# Patient Record
(demographics unavailable — no encounter records)

---

## 2024-11-20 NOTE — PLAN
[FreeTextEntry1] : -F/u pap -Rx BUS for dense breast tissue provided, discussed genetic testing, she will consider  -Recommended dermatology for routine, annual skin survey -Routine physical activity encouraged -RTO one year or sooner PRN for any new problems, questions or concerns

## 2024-11-20 NOTE — PHYSICAL EXAM
[Chaperone Present] : A chaperone was present in the examining room during all aspects of the physical examination [[id number]] : A chaperone was present during the pelvic exam. [FreeTextEntry2] : KOFI Tolentino  [Appropriately responsive] : appropriately responsive [Alert] : alert [No Acute Distress] : no acute distress [Soft] : soft [Non-tender] : non-tender [Non-distended] : non-distended [No Mass] : no mass [Oriented x3] : oriented x3 [Examination Of The Breasts] : a normal appearance [No Masses] : no breast masses were palpable [Labia Majora] : normal [Labia Minora] : normal [Atrophy] : atrophy [No Bleeding] : There was no active vaginal bleeding [Normal] : normal [Tenderness] : nontender [Uterine Adnexae] : non-palpable

## 2024-11-20 NOTE — PHYSICAL EXAM
[Chaperone Present] : A chaperone was present in the examining room during all aspects of the physical examination [[id number]] : A chaperone was present during the pelvic exam. [FreeTextEntry2] : OKFI Tolentino  [Appropriately responsive] : appropriately responsive [Alert] : alert [No Acute Distress] : no acute distress [Soft] : soft [Non-tender] : non-tender [Non-distended] : non-distended [No Mass] : no mass [Oriented x3] : oriented x3 [Examination Of The Breasts] : a normal appearance [No Masses] : no breast masses were palpable [Labia Majora] : normal [Labia Minora] : normal [Atrophy] : atrophy [No Bleeding] : There was no active vaginal bleeding [Normal] : normal [Tenderness] : nontender [Uterine Adnexae] : non-palpable

## 2024-11-20 NOTE — HISTORY OF PRESENT ILLNESS
[LMP unknown] : LMP unknown [postmenopausal] : postmenopausal [Y] : Positive pregnancy history [unknown] : Patient is unsure of the date of her LMP [Menarche Age: ____] : age at menarche was [unfilled] [Currently Active] : currently active [Men] : men [No] : No [Yes] : Yes [FreeTextEntry1] : Claudette is a 56 y.o.   female here for annual exam.   Sexually active, declines STI screening. Denies PMB.   Last pap 2022 NILM.   Screening mammogram 10/2024 BR-1, dense breast tissue. Mom with recent breast CA diagnosis and treatment, maternal aunt with breast CA in her late 40's- early 50's? Does not believe either had genetic testing. Option for screening discussed.  Denies family hx colorectal CA, no bowel symptoms. Has not had screening colonoscopy.   Usually exercising regularly, recently hurt her hip so less mobility at present. 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  [Mammogramdate] : 10/24/2024 [TextBox_19] : BR 1 [BreastSonogramDate] : 2/1/2023 [TextBox_25] : BR 1 [PapSmeardate] : 8/2/2022 [TextBox_31] : neg [HPVDate] : 8/2/2022 [TextBox_78] : neg [PGxTotal] : 6 [[facility]] : 1 [PGxPremature] : 2 [[facility]] : 3 [PGxABSpont] : 3

## 2024-11-20 NOTE — HISTORY OF PRESENT ILLNESS
[LMP unknown] : LMP unknown [postmenopausal] : postmenopausal [Y] : Positive pregnancy history [unknown] : Patient is unsure of the date of her LMP [Menarche Age: ____] : age at menarche was [unfilled] [Currently Active] : currently active [Men] : men [No] : No [Yes] : Yes [FreeTextEntry1] : Claudette is a 56 y.o.   female here for annual exam.   Sexually active, declines STI screening. Denies PMB.   Last pap 2022 NILM.   Screening mammogram 10/2024 BR-1, dense breast tissue. Mom with recent breast CA diagnosis and treatment, maternal aunt with breast CA in her late 40's- early 50's? Does not believe either had genetic testing. Option for screening discussed.  Denies family hx colorectal CA, no bowel symptoms. Has not had screening colonoscopy.   Usually exercising regularly, recently hurt her hip so less mobility at present. 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  [Mammogramdate] : 10/24/2024 [TextBox_19] : BR 1 [BreastSonogramDate] : 2/1/2023 [TextBox_25] : BR 1 [PapSmeardate] : 8/2/2022 [TextBox_31] : neg [HPVDate] : 8/2/2022 [TextBox_78] : neg [PGxTotal] : 6 [[facility]] : 1 [PGxPremature] : 2 [[facility] Center[facility]] : 3 [PGxABSpont] : 3

## 2025-01-15 NOTE — CONSULT LETTER
[Dear  ___] : Dear  [unfilled], [Consult Letter:] : I had the pleasure of evaluating your patient, [unfilled]. [Please see my note below.] : Please see my note below. [Consult Closing:] : Thank you very much for allowing me to participate in the care of this patient.  If you have any questions, please do not hesitate to contact me. [Sincerely,] : Sincerely, [FreeTextEntry2] : Chin Martinez MD, FACE ([place], [place]) [FreeTextEntry3] : Adam Ricketts MD, FACS  [facility] Associate Chair Department of Otolaryngology Professor Otolaryngology & Molecular Medicine [facility]

## 2025-01-15 NOTE — HISTORY OF PRESENT ILLNESS
[de-identified] : 56 year old female referred by Dr. Martinez for hypercalcemia. She reports knowing of elevated calcium levels for about one year.  Reports over the summer had 24 hr urine test and told calcium was elevated. Summer the PTH level was  163.2.  Last  two calcium blood level in Nov. 2024 were 10.7 and before that was 11.   Has not had a bone density test. Currently placed on VIt D supplements. Not on calcium supplements  or diuretics.  Denies hx of kidney stones or bone fractures. Denies having trouble focusing or having constipation. Denies pain, dysphagia, dysphonia and or dyspnea  11/11/2024 NM Parathyroid scan reporting normal Parathyroid scan Denies family hx of elevated calcium level    picked up on routine bloods about 2 yrs ago.  Denies cardiac or lung issues. Not on blood thinners.

## 2025-04-18 NOTE — CONSULT LETTER
[Dear  ___] : Dear  [unfilled], [Courtesy Letter:] : I had the pleasure of seeing your patient, [unfilled], in my office today. [Please see my note below.] : Please see my note below. [Sincerely,] : Sincerely, [FreeTextEntry2] : Chin Martinez MD, FACE ([place], [place]) [FreeTextEntry3] : KATHRIN Geronimo MD, [place] [address] Tel: [phone]  [facility] Associate Chair Department of Otolaryngology Professor of Otolaryngology & Molecular Medicine [facility]

## 2025-04-18 NOTE — HISTORY OF PRESENT ILLNESS
[de-identified] : 58yo female who is s/p parathyroidectomy on 4/10/2025 for hyperparathyroidism. Presents today for suture removal. Reports feeling well, better than before surgery. Denies fever, pain, dysphagia,  dysphonia and or dyspnea .  pt biochem cured at surgery after single gland removal

## 2025-04-18 NOTE — CONSULT LETTER
[Dear  ___] : Dear  [unfilled], [Courtesy Letter:] : I had the pleasure of seeing your patient, [unfilled], in my office today. [Please see my note below.] : Please see my note below. [Sincerely,] : Sincerely, [FreeTextEntry2] : Chin Martinez MD, FACE ([place], [place]) [FreeTextEntry3] : KATHRIN Geronimo MD, [place] [address] Tel: [phone]  [facility] Associate Chair Department of Otolaryngology Professor of Otolaryngology & Molecular Medicine [facility]

## 2025-04-18 NOTE — HISTORY OF PRESENT ILLNESS
[de-identified] : 58yo female who is s/p parathyroidectomy on 4/10/2025 for hyperparathyroidism. Presents today for suture removal. Reports feeling well, better than before surgery. Denies fever, pain, dysphagia,  dysphonia and or dyspnea .  pt biochem cured at surgery after single gland removal

## 2025-05-21 NOTE — CONSULT LETTER
[Dear  ___] : Dear  [unfilled], [Courtesy Letter:] : I had the pleasure of seeing your patient, [unfilled], in my office today. [Please see my note below.] : Please see my note below. [Sincerely,] : Sincerely, [FreeTextEntry2] : Chin Martinez MD, FACE ([place], [place]) [FreeTextEntry3] : Adam Ricketts MD, FACS     [facility] Associate Chair    Department of Otolaryngology  Professor Otolaryngology & Molecular Medicine [facility]

## 2025-05-21 NOTE — HISTORY OF PRESENT ILLNESS
[de-identified] : 56yo female who is s/p parathyroidectomy on 4/10/2025 for hyperparathyroidism.  Reports feeling well, better than before surgery.  Denies fever, pain, dysphagia, dysphonia and or dyspnea . pt biochem cured at surgery after single gland removal pending bone density scan 06/02/2025